# Patient Record
Sex: MALE | Race: WHITE
[De-identification: names, ages, dates, MRNs, and addresses within clinical notes are randomized per-mention and may not be internally consistent; named-entity substitution may affect disease eponyms.]

---

## 2020-05-06 ENCOUNTER — HOSPITAL ENCOUNTER (OUTPATIENT)
Dept: HOSPITAL 47 - LABWHC1 | Age: 20
Discharge: HOME | End: 2020-05-06
Attending: FAMILY MEDICINE
Payer: COMMERCIAL

## 2020-05-06 DIAGNOSIS — R10.84: Primary | ICD-10-CM

## 2020-05-06 DIAGNOSIS — R19.7: ICD-10-CM

## 2020-05-06 PROCEDURE — 87635 SARS-COV-2 COVID-19 AMP PRB: CPT

## 2020-07-29 ENCOUNTER — HOSPITAL ENCOUNTER (OUTPATIENT)
Dept: HOSPITAL 47 - LABWHC1 | Age: 20
Discharge: HOME | End: 2020-07-29
Attending: PHYSICIAN ASSISTANT
Payer: COMMERCIAL

## 2020-07-29 DIAGNOSIS — Z20.828: Primary | ICD-10-CM

## 2020-07-29 DIAGNOSIS — R06.02: ICD-10-CM

## 2020-07-29 DIAGNOSIS — R05: ICD-10-CM

## 2023-05-31 ENCOUNTER — HOSPITAL ENCOUNTER (OUTPATIENT)
Dept: HOSPITAL 47 - RADXRMAIN | Age: 23
Discharge: HOME | End: 2023-05-31
Attending: EMERGENCY MEDICINE
Payer: COMMERCIAL

## 2023-05-31 DIAGNOSIS — S41.142A: Primary | ICD-10-CM

## 2023-05-31 NOTE — XR
EXAMINATION TYPE: XR humerus LT

 

DATE OF EXAM: 5/31/2023

 

COMPARISON: NONE

 

HISTORY: 22-year-old male S41.142A, previous surgery to the arm, injury.

 

TECHNIQUE: 2 views

 

FINDINGS: 

Lateral plate and screw fixation mid humeral shaft. Mature healing callus is present here. No acute f
racture, subluxation, or dislocation.

 

IMPRESSION:

Previous lateral plate and screw fixation mid humeral shaft. No acute osseous abnormality seen.

## 2023-07-15 ENCOUNTER — HOSPITAL ENCOUNTER (EMERGENCY)
Dept: HOSPITAL 47 - EC | Age: 23
Discharge: HOME | End: 2023-07-15
Payer: COMMERCIAL

## 2023-07-15 VITALS — SYSTOLIC BLOOD PRESSURE: 132 MMHG | HEART RATE: 89 BPM | DIASTOLIC BLOOD PRESSURE: 78 MMHG

## 2023-07-15 VITALS — TEMPERATURE: 99.2 F | RESPIRATION RATE: 20 BRPM

## 2023-07-15 DIAGNOSIS — M25.512: Primary | ICD-10-CM

## 2023-07-15 DIAGNOSIS — F17.290: ICD-10-CM

## 2023-07-15 DIAGNOSIS — Z86.59: ICD-10-CM

## 2023-07-15 PROCEDURE — 99283 EMERGENCY DEPT VISIT LOW MDM: CPT

## 2023-07-15 NOTE — ED
Extremity Problem HPI





- General


Chief complaint: Extremity Problem,Nontraumatic


Stated complaint: Rt arm pain


Time Seen by Provider: 07/15/23 21:07


Source: patient


Mode of arrival: ambulatory


Limitations: no limitations





- History of Present Illness


Initial comments: 


23-year-old male presenting with chief complaint of left arm pain.  Pain is 

mainly in the upper arm.  States that it started a few days ago.  Patient has 

history of midshaft humerus fracture 6 years ago with hardware currently in 

place.  No new injury or trauma.  No swelling to the arm.  No chest pain or 

difficulty breathing.  No fevers or chills.  No erythema or swelling.  Arm is 

not currently in pain.  








- Related Data


                                    Allergies











Allergy/AdvReac Type Severity Reaction Status Date / Time


 


No Known Allergies Allergy   Verified 07/15/23 20:32














Review of Systems


ROS Statement: 


Those systems with pertinent positive or pertinent negative responses have been 

documented in the HPI.





ROS Other: All systems not noted in ROS Statement are negative.





Past Medical History


Past Medical History: No Reported History


History of Any Multi-Drug Resistant Organisms: None Reported


Additional Past Surgical History / Comment(s): Left arm surgery


Past Psychological History: Anxiety, Depression


Smoking Status: Vaper


Past Alcohol Use History: None Reported


Past Drug Use History: None Reported





General Exam


Limitations: no limitations


General appearance: alert, in no apparent distress


Head exam: Present: atraumatic, normocephalic, normal inspection


Eye exam: Present: normal appearance


Neck exam: Present: normal inspection, full ROM


Respiratory exam: Present: normal lung sounds bilaterally.  Absent: respiratory 

distress, wheezes, rales, rhonchi, stridor


Cardiovascular Exam: Present: regular rate, normal rhythm, normal heart sounds. 

Absent: systolic murmur, diastolic murmur, rubs, gallop, clicks


Extremities exam: Present: normal capillary refill


  ** Left


Upper Arm exam: Present: normal inspection, full ROM.  Absent: tenderness, 

swelling, ecchymosis, deformity, erythema


Vascular: Present: radial pulse (2+)


Neurological exam: Present: alert, oriented X3, CN II-XII intact


Psychiatric exam: Present: normal affect, normal mood


Skin exam: Present: warm, dry, intact, normal color.  Absent: rash





Course


                                   Vital Signs











  07/15/23 07/15/23





  20:30 22:45


 


Temperature 99.2 F 99.2 F


 


Pulse Rate 105 H 89


 


Respiratory 20 20





Rate  


 


Blood Pressure 142/81 132/78


 


O2 Sat by Pulse 100 100





Oximetry  














Medical Decision Making





- Medical Decision Making


Was pt. sent in by a medical professional or institution (, PA, NP, urgent 

care, hospital, or nursing home...) When possible be specific


@  -No


Did you speak to anyone other than the patient for history (EMS, parent, family,

police, friend...)? What history was obtained from this source 


@  -No


Did you review nursing and triage notes (agree or disagree)?  Why? 


@  -I reviewed and agree with nursing and triage notes


Were old charts reviewed (outside hosp., previous admission, EMS record, old 

EKG, old radiological studies, urgent care reports/EKG's, nursing home records)?

Report findings 


@  -No old charts were reviewed


Differential Diagnosis (chest pain, altered mental status, abdominal pain women,

abdominal pain men, vaginal bleeding, weakness, fever, dyspnea, syncope, 

headache, dizziness, GI bleed, back pain, seizure, CVA, palpatations, mental 

health, musculoskeletal)? 


@  -Differential Musculoskeletal


Muscular strain, contusion, ligament sprain, fracture, arthritis, septic 

arthritis, bursitis, cellulitis, muscle spasm, nerve compression, DVT, arterial 

occlusion, herpes zoster, electrolyte abnormality, tumor.... This is not meant 

to be in all inclusive list


EKG interpreted by me (3pts min.).


@  -As above


X-rays interpreted by me (1pt min.).


@  -X-ray shows a lateral plate and screw fixation across an old healed mid 

humeral shaft fracture.  No acute osseous abnormality seen.


CT interpreted by me (1pt min.).


@  -None done


U/S interpreted by me (1pt. min.).


@  -None done


What testing was considered but not performed or refused? (CT, X-rays, U/S, 

labs)? Why?


@  -None


What meds were considered but not given or refused? Why?


@  -None


Did you discuss the management of the patient with other professionals 

(professionals i.e. STEPH Gunn, NP, lab, RT, psych nurse, , , 

teacher, , )? Give summary


@  -No


Was smoking cessation discussed for >3mins.?


@  -No


Was critical care preformed (if so, how long)?


@  -No


Were there social determinants of health that impacted care today? How? 

(Homelessness, low income, unemployed, alcoholism, drug addiction, 

transportation, low edu. Level, literacy, decrease access to med. care, [facility], 

rehab)?


@  -No


Was there de-escalation of care discussed even if they declined (Discuss DNR or 

withdrawal of care, Hospice)? DNR status


@  -No


What co-morbidities impacted this encounter? (DM, HTN, Smoking, COPD, CAD, 

Cancer, CVA, ARF, Chemo, Hep., AIDS, mental health diagnosis, sleep apnea, 

morbid obesity)?


@  -None


Was patient admitted / discharged? Hospital course, mention meds given and 

route, prescriptions, significant lab abnormalities, going to OR and other 

pertinent info.


@  -23-year-old male presenting with chief complaint of left upper arm pain.  

Has history of midshaft humerus fracture 6 years ago with hardware in place.  No

new injury or trauma.  No deformity, swelling, redness.  Physical examination is

unremarkable.  X-ray is negative.  He is neurovascularly intact.  Patient is 

educated on today's findings on supportive management at home. Follow-up with 

PCP.  Report back to ER with any new or worsening symptoms.  Discussed return 

parameters and answered all questions.  Patient conveyed verbal understanding 

and agreed to the plan.  I discussed this case in detail with my attending Dr. Zamarripa 


Undiagnosed new problem with uncertain prognosis?


@  -No


Drug Therapy requiring intensive monitoring for toxicity (Heparin, Nitro, 

Insulin, Cardizem)?


@  -No


Were any procedures done?


@  -No


Diagnosis/symptom?


@  -Arm pain


Acute, or Chronic, or Acute on Chronic?


@  -Acute


Uncomplicated (without systemic symptoms) or Complicated (systemic symptoms)?


@  -Uncomplicated


Side effects of treatment?


@  -No


Exacerbation, Progression, or Severe Exacerbation?


@  -No


Poses a threat to life or bodily function? How? (Chest pain, USA, MI, pneumonia,

PE, COPD, DKA, ARF, appy, cholecystitis, CVA, Diverticulitis, Homicidal, 

Suicidal, threat to staff... and all critical care pts)


@  -No








Disposition


Clinical Impression: 


 Shoulder pain





Disposition: HOME SELF-CARE


Condition: Good


Instructions (If sedation given, give patient instructions):  Shoulder Pain (ED)


Additional Instructions: 


Follow-up with PCP and your orthopedist if needed.  Report back to ER with any 

new or worsening symptoms.  Alternate Motrin and Tylenol as needed for pain 

control.


Is patient prescribed a controlled substance at d/c from ED?: No


Referrals: 


Zenon Baumann DO [Primary Care Provider] - 1-2 days


Time of Disposition: 22:37

## 2023-07-15 NOTE — XR
EXAMINATION TYPE: XR humerus LT

 

DATE OF EXAM: 7/15/2023

 

COMPARISON: 5/31/2023

 

HISTORY: 22-year-old male pain to the proximal humerus.

 

TECHNIQUE: 2 views

 

FINDINGS: 

Lateral plate and screw fixation across a well-healed fracture of the mid humeral shaft. No periostit
is or osteolysis. No acute fracture, subluxation, or dislocation. Shoulder and elbow joints appear gr
ossly intact.

 

IMPRESSION:

Lateral plate and screw fixation across an old healed mid humeral shaft fracture. No acute osseous ab
normality seen.

## 2025-06-23 ENCOUNTER — HOSPITAL ENCOUNTER (EMERGENCY)
Dept: HOSPITAL 47 - EC | Age: 25
Discharge: HOME | End: 2025-06-23
Payer: COMMERCIAL

## 2025-06-23 VITALS — TEMPERATURE: 98.7 F | HEART RATE: 92 BPM | DIASTOLIC BLOOD PRESSURE: 77 MMHG | SYSTOLIC BLOOD PRESSURE: 138 MMHG

## 2025-06-23 VITALS — RESPIRATION RATE: 18 BRPM
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
## 2025-06-23 DIAGNOSIS — S61.217A: Primary | ICD-10-CM

## 2025-06-23 DIAGNOSIS — F17.290: ICD-10-CM

## 2025-06-23 PROCEDURE — 99282 EMERGENCY DEPT VISIT SF MDM: CPT

## 2025-06-23 PROCEDURE — 12001 RPR S/N/AX/GEN/TRNK 2.5CM/<: CPT

## 2025-06-23 RX ADMIN — LIDOCAINE HYDROCHLORIDE ONE ML: 10 INJECTION, SOLUTION INFILTRATION; PERINEURAL at 22:40
